# Patient Record
Sex: MALE | Race: OTHER | HISPANIC OR LATINO | URBAN - METROPOLITAN AREA
[De-identification: names, ages, dates, MRNs, and addresses within clinical notes are randomized per-mention and may not be internally consistent; named-entity substitution may affect disease eponyms.]

---

## 2023-09-04 ENCOUNTER — EMERGENCY (EMERGENCY)
Facility: HOSPITAL | Age: 48
LOS: 1 days | Discharge: HOME | End: 2023-09-04
Attending: EMERGENCY MEDICINE | Admitting: EMERGENCY MEDICINE
Payer: OTHER MISCELLANEOUS

## 2023-09-04 VITALS
SYSTOLIC BLOOD PRESSURE: 128 MMHG | TEMPERATURE: 98 F | HEART RATE: 74 BPM | OXYGEN SATURATION: 100 % | RESPIRATION RATE: 16 BRPM | DIASTOLIC BLOOD PRESSURE: 81 MMHG

## 2023-09-04 VITALS
HEIGHT: 66.14 IN | TEMPERATURE: 98 F | HEART RATE: 79 BPM | RESPIRATION RATE: 15 BRPM | DIASTOLIC BLOOD PRESSURE: 89 MMHG | WEIGHT: 177.03 LBS | SYSTOLIC BLOOD PRESSURE: 133 MMHG | OXYGEN SATURATION: 97 %

## 2023-09-04 LAB
ALBUMIN SERPL ELPH-MCNC: 4.1 G/DL — SIGNIFICANT CHANGE UP (ref 3.4–5)
ALP SERPL-CCNC: 113 U/L — SIGNIFICANT CHANGE UP (ref 40–120)
ALT FLD-CCNC: 62 U/L — HIGH (ref 12–42)
ANION GAP SERPL CALC-SCNC: 5 MMOL/L — LOW (ref 9–16)
AST SERPL-CCNC: 32 U/L — SIGNIFICANT CHANGE UP (ref 15–37)
BASOPHILS # BLD AUTO: 0.06 K/UL — SIGNIFICANT CHANGE UP (ref 0–0.2)
BASOPHILS NFR BLD AUTO: 0.5 % — SIGNIFICANT CHANGE UP (ref 0–2)
BILIRUB SERPL-MCNC: 0.3 MG/DL — SIGNIFICANT CHANGE UP (ref 0.2–1.2)
BUN SERPL-MCNC: 15 MG/DL — SIGNIFICANT CHANGE UP (ref 7–23)
CALCIUM SERPL-MCNC: 9.4 MG/DL — SIGNIFICANT CHANGE UP (ref 8.5–10.5)
CHLORIDE SERPL-SCNC: 104 MMOL/L — SIGNIFICANT CHANGE UP (ref 96–108)
CO2 SERPL-SCNC: 28 MMOL/L — SIGNIFICANT CHANGE UP (ref 22–31)
CREAT SERPL-MCNC: 1 MG/DL — SIGNIFICANT CHANGE UP (ref 0.5–1.3)
EGFR: 92 ML/MIN/1.73M2 — SIGNIFICANT CHANGE UP
EOSINOPHIL # BLD AUTO: 0.05 K/UL — SIGNIFICANT CHANGE UP (ref 0–0.5)
EOSINOPHIL NFR BLD AUTO: 0.4 % — SIGNIFICANT CHANGE UP (ref 0–6)
FLUAV AG NPH QL: SIGNIFICANT CHANGE UP
FLUBV AG NPH QL: SIGNIFICANT CHANGE UP
GLUCOSE SERPL-MCNC: 204 MG/DL — HIGH (ref 70–99)
HCT VFR BLD CALC: 43.9 % — SIGNIFICANT CHANGE UP (ref 39–50)
HGB BLD-MCNC: 15 G/DL — SIGNIFICANT CHANGE UP (ref 13–17)
IMM GRANULOCYTES NFR BLD AUTO: 0.7 % — SIGNIFICANT CHANGE UP (ref 0–0.9)
LIDOCAIN IGE QN: 36 U/L — SIGNIFICANT CHANGE UP (ref 16–77)
LYMPHOCYTES # BLD AUTO: 1.28 K/UL — SIGNIFICANT CHANGE UP (ref 1–3.3)
LYMPHOCYTES # BLD AUTO: 10 % — LOW (ref 13–44)
MAGNESIUM SERPL-MCNC: 2.2 MG/DL — SIGNIFICANT CHANGE UP (ref 1.6–2.6)
MCHC RBC-ENTMCNC: 30.2 PG — SIGNIFICANT CHANGE UP (ref 27–34)
MCHC RBC-ENTMCNC: 34.2 GM/DL — SIGNIFICANT CHANGE UP (ref 32–36)
MCV RBC AUTO: 88.3 FL — SIGNIFICANT CHANGE UP (ref 80–100)
MONOCYTES # BLD AUTO: 0.6 K/UL — SIGNIFICANT CHANGE UP (ref 0–0.9)
MONOCYTES NFR BLD AUTO: 4.7 % — SIGNIFICANT CHANGE UP (ref 2–14)
NEUTROPHILS # BLD AUTO: 10.72 K/UL — HIGH (ref 1.8–7.4)
NEUTROPHILS NFR BLD AUTO: 83.7 % — HIGH (ref 43–77)
NRBC # BLD: 0 /100 WBCS — SIGNIFICANT CHANGE UP (ref 0–0)
NT-PROBNP SERPL-SCNC: 8 PG/ML — SIGNIFICANT CHANGE UP
PLATELET # BLD AUTO: 205 K/UL — SIGNIFICANT CHANGE UP (ref 150–400)
POTASSIUM SERPL-MCNC: 4.3 MMOL/L — SIGNIFICANT CHANGE UP (ref 3.5–5.3)
POTASSIUM SERPL-SCNC: 4.3 MMOL/L — SIGNIFICANT CHANGE UP (ref 3.5–5.3)
PROT SERPL-MCNC: 8.1 G/DL — SIGNIFICANT CHANGE UP (ref 6.4–8.2)
RBC # BLD: 4.97 M/UL — SIGNIFICANT CHANGE UP (ref 4.2–5.8)
RBC # FLD: 12.6 % — SIGNIFICANT CHANGE UP (ref 10.3–14.5)
RSV RNA NPH QL NAA+NON-PROBE: SIGNIFICANT CHANGE UP
SARS-COV-2 RNA SPEC QL NAA+PROBE: SIGNIFICANT CHANGE UP
SODIUM SERPL-SCNC: 137 MMOL/L — SIGNIFICANT CHANGE UP (ref 132–145)
TROPONIN I, HIGH SENSITIVITY RESULT: <4 NG/L — SIGNIFICANT CHANGE UP
WBC # BLD: 12.8 K/UL — HIGH (ref 3.8–10.5)
WBC # FLD AUTO: 12.8 K/UL — HIGH (ref 3.8–10.5)

## 2023-09-04 PROCEDURE — 72125 CT NECK SPINE W/O DYE: CPT | Mod: 26

## 2023-09-04 PROCEDURE — 70450 CT HEAD/BRAIN W/O DYE: CPT | Mod: 26

## 2023-09-04 PROCEDURE — 99285 EMERGENCY DEPT VISIT HI MDM: CPT

## 2023-09-04 RX ORDER — KETOROLAC TROMETHAMINE 30 MG/ML
15 SYRINGE (ML) INJECTION ONCE
Refills: 0 | Status: DISCONTINUED | OUTPATIENT
Start: 2023-09-04 | End: 2023-09-04

## 2023-09-04 RX ORDER — SODIUM CHLORIDE 9 MG/ML
1000 INJECTION INTRAMUSCULAR; INTRAVENOUS; SUBCUTANEOUS ONCE
Refills: 0 | Status: COMPLETED | OUTPATIENT
Start: 2023-09-04 | End: 2023-09-04

## 2023-09-04 RX ORDER — ACETAMINOPHEN 500 MG
1000 TABLET ORAL ONCE
Refills: 0 | Status: COMPLETED | OUTPATIENT
Start: 2023-09-04 | End: 2023-09-04

## 2023-09-04 RX ADMIN — SODIUM CHLORIDE 1000 MILLILITER(S): 9 INJECTION INTRAMUSCULAR; INTRAVENOUS; SUBCUTANEOUS at 15:29

## 2023-09-04 RX ADMIN — Medication 400 MILLIGRAM(S): at 15:29

## 2023-09-04 RX ADMIN — Medication 15 MILLIGRAM(S): at 15:30

## 2023-09-04 NOTE — ED PROVIDER NOTE - OBJECTIVE STATEMENT
48 y/o M presents for head injury after he fell from standing on a 3 foot stool while at work today. Patient states he fell backwards and hit the back of his head on a wood floor. He does not recall what happened right before or after the fall. As per coworker he found patient on the floor with his eyes red. Unclear if there was LOC. No lightheadedness, no neck pain. +Nausea. No vomiting, no weakness, no numbness, no chest pain.

## 2023-09-04 NOTE — ED PROVIDER NOTE - CLINICAL SUMMARY MEDICAL DECISION MAKING FREE TEXT BOX
46 y/o M with no significant PMH, presents with headache and neck pain after fall from standing on 3 foot stool. On exam no obvious signs of head trauma. No midline spinal tenderness. No evidence of injury to extremity. Suspect likely mechanical fall though unclear Hx and possible syncopal fall. Plan for labs, cardiac monitor, IV hydration, CT head and CT c-spine, analgesia, and reassess.

## 2023-09-04 NOTE — ED PROVIDER NOTE - PATIENT PORTAL LINK FT
You can access the FollowMyHealth Patient Portal offered by [facility] by registering at the following website: [email or web address]. By joining [employer]’s FollowMyHealth portal, you will also be able to view your health information using other applications (apps) compatible with our system.

## 2023-09-04 NOTE — ED ADULT NURSE NOTE - OBJECTIVE STATEMENT
Pt fell off from standing on chair. + head strike (back of head) + LOC for 5-10min as per colleague. A&Ox4 in ED.

## 2023-09-04 NOTE — ED ADULT TRIAGE NOTE - CHIEF COMPLAINT QUOTE
fell apx 3ft while standing on a chair, striking head on the ground, +loc, per colleague he was "unconscious for 10-15 minutes"

## 2023-09-04 NOTE — ED ADULT NURSE NOTE - NSFALLUNIVINTERV_ED_ALL_ED
Bed/Stretcher in lowest position, wheels locked, appropriate side rails in place/Call bell, personal items and telephone in reach/Instruct patient to call for assistance before getting out of bed/chair/stretcher/Non-slip footwear applied when patient is off stretcher/[place] to call system/Physically safe environment - no spills, clutter or unnecessary equipment/Purposeful proactive rounding/Room/bathroom lighting operational, light cord in reach

## 2023-09-04 NOTE — ED PROVIDER NOTE - NSFOLLOWUPINSTRUCTIONS_ED_ALL_ED_FT
Conmoción cerebral en los adultos  Concussion, Adult  Three rear views of the head showing how quick, sudden head movements injure the brain.  Brianna conmoción cerebral es brianna lesión en el cerebro a causa de un golpe jono y directo (traumatismo) en la larry o el cuerpo. Amanda golpe directo hace que el cerebro se sacuda rápidamente hacia atrás y hacia adelante dentro del cráneo. [place] puede dañar las células cerebrales y causar cambios químicos en el cerebro. Brianna conmoción cerebral también puede conocerse aline traumatismo craneoencefálico (TCE) leve.    Los efectos de brianna conmoción cerebral pueden ser graves. Si usted sufre brianna conmoción cerebral, debe tener mucho cuidado para evitar sufrir brianna segunda conmoción cerebral.    ¿Cuáles son las causas?  Las causas de esta afección son:  Un golpe directo en la larry.  Un movimiento repentino del cuerpo que hace que el cerebro se mueva hacia atrás y hacia adelante dentro del cráneo, aline en un choque de automóvil.  ¿Cuáles son los signos o síntomas?  Los signos de brianna conmoción cerebral pueden ser difíciles de notar. En un primer momento, los pacientes, familiares y profesionales nolan vez no los adviertan. Puede ser que usted se nabila zakia por fuera, tanya que actúe o se sienta diferente.    Cada lesión en la larry es diferente. Por lo general, los síntomas son temporales, tanya pueden durar algunos días, semanas o incluso meses. Algunos síntomas aparecen de inmediato; sin embargo, otros quizás no se manifiesten sino hasta en horas o días.    Síntomas físicos    Carmen de larry.  Mareos y problemas de coordinación o equilibrio.  Sensibilidad a la chuy o los ruidos.  Náuseas o vómitos.  Cansancio (fatiga).  Problemas de visión o audición.  Convulsiones.  Síntomas mentales y emocionales    Irritabilidad o cambios de humor.  Problemas de memoria.  Dificultad para concentrarse, organizarse o lorenza decisiones.  Cambios en los hábitos de alimentación o en el sueño.  Lentitud para pensar, actuar o reaccionar, hablar o leer.  Ansiedad o depresión.  ¿Cómo se diagnostica?  Esta afección se diagnostica en función de los síntomas y de la lesión.    También pueden hacerle pruebas, que incluyen las siguientes:  Pruebas de diagnóstico por imágenes, aline brianna exploración por tomografía computarizada (TC) o brianna resonancia magnética (RM).  Pruebas neuropsicológicas. Con ellas, se examinan el pensamiento, la comprensión, el aprendizaje y la memoria.  ¿Cómo se trata?  El tratamiento de esta afección incluye lo siguiente:  Detener la práctica de deportes o actividades si está lesionado.  Reposo físico y mental y observación atenta, por lo general, en el hogar.  Medicamentos para ayudar con los síntomas tales aline dolor de larry, náuseas o dificultad para dormir.  Derivación a brianna clínica o centro de rehabilitación que se especialice en conmociones cerebrales.  Siga estas instrucciones en mcpherson casa:  Actividad    Limite las actividades que requieren mucha atención o concentración, aline las siguientes:  Trabajos escolares o trabajos relacionados con el empleo.  Mirar televisión.  Usar la computadora o el teléfono.  Jugar juegos de memoria y armar rompecabezas.  El reposo favorece la curación del cerebro. Asegúrese de hacer lo siguiente:  Duerma lo suficiente. La mayoría de los adultos debería dormir entre 7 y 9 horas todas las noches.  Descanse sean el día. [place] siestas o sol pausas cuando se sienta cansado.  Evite el ejercicio de bobbi intensidad y las actividades físicas que exijan mucho esfuerzo. Interrumpa cualquier actividad que empeore los síntomas. El médico puede recomendarle ejercicios ligeros, aline caminar.  No realice actividades de alto riesgo que pudieran provocar brianna segunda conmoción cerebral, aline andar en bicicleta o practicar deportes.  Pregúntele al médico cuándo puede retomar tiera actividades normales, aline la escuela, el trabajo, los deportes y conducir. La capacidad para reaccionar puede ser más lenta luego de brianna lesión cerebral. Nunca realice estas actividades si se siente mareado.  Instrucciones generales    A bottle of beer, a glass of wine, and a glass of hard liquor with a "do not drink" sign over them.   Use los medicamentos de venta bruce y los recetados solamente aline se lo haya indicado el médico. Algunos medicamentos, aline los anticoagulantes y la aspirina, pueden aumentar el riesgo de sufrir complicaciones, aline hemorragias.  Evite lorenza analgésicos opioides mientras se recupera de brianna conmoción cerebral.  No jessica alcohol hasta que el médico se lo autorice. Beber alcohol puede demorar mcpherson recuperación y ponerlo en riesgo de nuevas lesiones.  Controle tiera síntomas y pídales a otras personas cercanas que también lo sean. En algunos casos pueden aparecer complicaciones luego de brianna conmoción cerebral.  Informe a mcpherson jefe en el trabajo, los maestros, el departamento de enfermería de la escuela, el consejero escolar o el entrenador deportivo acerca de mcpherson lesión, los síntomas y las restricciones que tiene.  Consulte a un terapeuta de valerie mental si se siente ansioso o deprimido. Controlar esta afección puede ser un desafío.  Concurra a todas las visitas de seguimiento. El médico verá cómo va mcpherson recuperación y le dará un plan para volver a tiera actividades.  ¿Cómo se previene?  Es muy importante que evite otra lesión cerebral. En casos poco frecuentes, brianna nueva lesión puede causar daños cerebrales permanentes, hinchazón del cerebro o la muerte. El riesgo es mayor sean los primeros 7 a 10 días después de brianna lesión en la larry. Evite las lesiones:  Suspenda las actividades que podrían causar brianna segunda conmoción cerebral, aline deportes de contacto o recreativos, hasta que mcpherson médico lo autorice.  [place] estas medidas brianna vez que haya regresado a la práctica de deportes o actividades:  Evite las jugadas o movimientos que puedan ocasionar que choque contra otra persona. Esta es la forma en que ocurre la mayoría de las conmociones cerebrales.  Siga las reglas y respete a los demás jugadores. No se involucre en jugadas violentas o ilegales.  Sol ejercicio con regularidad que incluya entrenamiento de fuerza y equilibrio.  Use un andreina que le calce zakia sean la práctica de deportes, al andar en bicicleta o al realizar otras actividades. Los cascos pueden ayudar a protegerlo de las lesiones graves en el cráneo y el cerebro, tanya posiblemente no lo protejan de brianna conmoción cerebral. Incluso al usar andreina, debe tratar de no recibir un golpe en la larry.  Dónde obtener más información  Centers for Disease Control and Prevention (Centros para el Control y la Prevención de Enfermedades): cdc.gov  Comuníquese con un médico si:  Los síntomas no mejoran o empeoran.  Aparecen nuevos síntomas.  Sufre brianna nueva lesión.  Mcpherson coordinación empeora.  Tiene cambios de conducta inusuales.  Solicite ayuda de inmediato si:  Tiene dolor de larry intenso o que empeora.  Tiene debilidad o adormecimiento en cualquier parte del cuerpo, dificultad para articular palabras, cambios en la visión o confusión.  Vomita repetidas veces.  Pierde la conciencia, tiene más sueño de lo normal o tiene dificultad para despertarse.  Tiene brianna convulsión.  Estos síntomas pueden indicar brianna emergencia. Solicite ayuda de inmediato. Llame al 911.  No espere a yazan si los síntomas desaparecen.  No conduzca por tiera propios medios hasta el hospital.  Solicite ayuda de inmediato también si:  Piensa acerca de lastimarse o lastimar a otras personas.  Siga alguno de estos pasos si siente que puede lastimarse a sí mismo o a otras personas, o tiene pensamientos de poner fin a mcpherson anais:  Diríjase al centro de urgencias más cercano.  Llame al 911.  Llame a National Suicide Prevention Lifeline ([place]nea [employer] Nacional para la Prevención del Suicidio) al 9-872-770-0026 o al 988. Está disponible las 24 horas del día.  Envíe un mensaje de texto a la línea para casos de crisis al [id number].  Esta información no tiene aline fin reemplazar el consejo del médico. Asegúrese de hacerle al médico cualquier pregunta que tenga.

## 2023-09-08 DIAGNOSIS — Z20.822 CONTACT WITH AND (SUSPECTED) EXPOSURE TO COVID-19: ICD-10-CM

## 2023-09-08 DIAGNOSIS — S09.90XA UNSPECIFIED INJURY OF HEAD, INITIAL ENCOUNTER: ICD-10-CM
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
## 2023-09-08 DIAGNOSIS — R11.0 NAUSEA: ICD-10-CM
